# Patient Record
Sex: FEMALE | Race: WHITE | NOT HISPANIC OR LATINO
[De-identification: names, ages, dates, MRNs, and addresses within clinical notes are randomized per-mention and may not be internally consistent; named-entity substitution may affect disease eponyms.]

---

## 2017-06-07 ENCOUNTER — TRANSCRIPTION ENCOUNTER (OUTPATIENT)
Age: 72
End: 2017-06-07

## 2020-02-11 ENCOUNTER — APPOINTMENT (OUTPATIENT)
Dept: INTERNAL MEDICINE | Facility: CLINIC | Age: 75
End: 2020-02-11
Payer: MEDICARE

## 2020-02-11 ENCOUNTER — NON-APPOINTMENT (OUTPATIENT)
Age: 75
End: 2020-02-11

## 2020-02-11 VITALS
HEIGHT: 67 IN | RESPIRATION RATE: 18 BRPM | HEART RATE: 77 BPM | BODY MASS INDEX: 29.51 KG/M2 | OXYGEN SATURATION: 96 % | DIASTOLIC BLOOD PRESSURE: 82 MMHG | WEIGHT: 188 LBS | TEMPERATURE: 97.8 F | SYSTOLIC BLOOD PRESSURE: 128 MMHG

## 2020-02-11 DIAGNOSIS — R05 COUGH: ICD-10-CM

## 2020-02-11 DIAGNOSIS — K21.9 GASTRO-ESOPHAGEAL REFLUX DISEASE W/OUT ESOPHAGITIS: ICD-10-CM

## 2020-02-11 DIAGNOSIS — R09.82 POSTNASAL DRIP: ICD-10-CM

## 2020-02-11 DIAGNOSIS — J45.909 UNSPECIFIED ASTHMA, UNCOMPLICATED: ICD-10-CM

## 2020-02-11 PROCEDURE — 99204 OFFICE O/P NEW MOD 45 MIN: CPT | Mod: 25

## 2020-02-11 PROCEDURE — 94060 EVALUATION OF WHEEZING: CPT

## 2020-02-11 RX ORDER — ATENOLOL 100 MG/1
100 TABLET ORAL
Refills: 0 | Status: ACTIVE | COMMUNITY

## 2020-02-11 NOTE — PHYSICAL EXAM
[No Acute Distress] : no acute distress [Normal Oropharynx] : normal oropharynx [No Neck Mass] : no neck mass [Normal Appearance] : normal appearance [Normal Rate/Rhythm] : normal rate/rhythm [Normal S1, S2] : normal s1, s2 [No Murmurs] : no murmurs [No Resp Distress] : no resp distress [Clear to Auscultation Bilaterally] : clear to auscultation bilaterally [Benign] : benign [No Abnormalities] : no abnormalities [No Cyanosis] : no cyanosis [No Edema] : no edema [No Clubbing] : no clubbing [Normal Color/ Pigmentation] : normal color/ pigmentation [No Rash] : no rash [No Focal Deficits] : no focal deficits [Normal Insight/judgment] : normal insight/judgment [Normal Affect] : normal affect

## 2020-02-11 NOTE — REVIEW OF SYSTEMS
[Fever] : no fever [Chills] : no chills [Cough] : cough [Sputum] : no sputum [Hemoptysis] : no hemoptysis [Chest Discomfort] : no chest discomfort [Dyspnea] : no dyspnea [Palpitations] : no palpitations [Dizziness] : no dizziness [Nasal Discharge] : nasal discharge [Negative] : Endocrine

## 2020-02-11 NOTE — HISTORY OF PRESENT ILLNESS
[TextBox_4] : This is initial pulmonary visit here for this 74-year-old female who has noted a cough since November of this year. She tells me she has had prolonged episodes of cough especially during the cold months season in the past. She is not noting sputum, hemoptysis, or dyspnea on exertion. She does notice a rare wheeze.  She is noting a runny nose, and postnasal drip. She is not noting heartburn but tells me that she does awaken during her sleep and has snacks.  She is not noting fever or chills.  She is a nonsmoker but did have secondhand smoke exposure during childhood. She has no history of asthma or COPD.

## 2020-02-11 NOTE — ASSESSMENT
[FreeTextEntry1] : #1 history of persistent cough that tend to occur during the cold months seasons. Patient has runny nose and postnasal drip.  For component of postnasal drip will treat with nasal saline spray p.r.n. and Flonase one spray per nostril b.i.d.  Possible component of GERD. Patient will stop snacks that she has when she awakens during sleep.  Begin Pepcid 20 mg p.o. q.d. q.h.s.  For possible airway reactivity in cold air, patient will begin Flovent 110 2 puffs b.i.d. with gargle after.  She'll also take Ceftin x5 days to treat for possible acute bronchitis.  PA and lateral chest x-ray.  For following appointment in one month for a return anytime on a p.r.n. basis.\par \par #2 Postnasal drip.\par #3 GERD.\par #4 Reactive airways.

## 2021-06-27 ENCOUNTER — EMERGENCY (EMERGENCY)
Facility: HOSPITAL | Age: 76
LOS: 0 days | Discharge: HOME | End: 2021-06-27
Attending: EMERGENCY MEDICINE
Payer: MEDICARE

## 2021-06-27 VITALS
TEMPERATURE: 98 F | OXYGEN SATURATION: 98 % | HEART RATE: 64 BPM | RESPIRATION RATE: 18 BRPM | DIASTOLIC BLOOD PRESSURE: 73 MMHG | SYSTOLIC BLOOD PRESSURE: 153 MMHG

## 2021-06-27 VITALS — WEIGHT: 225.09 LBS | HEIGHT: 67 IN

## 2021-06-27 DIAGNOSIS — R10.9 UNSPECIFIED ABDOMINAL PAIN: ICD-10-CM

## 2021-06-27 DIAGNOSIS — N20.0 CALCULUS OF KIDNEY: ICD-10-CM

## 2021-06-27 DIAGNOSIS — N13.2 HYDRONEPHROSIS WITH RENAL AND URETERAL CALCULOUS OBSTRUCTION: ICD-10-CM

## 2021-06-27 DIAGNOSIS — N23 UNSPECIFIED RENAL COLIC: ICD-10-CM

## 2021-06-27 LAB
ALBUMIN SERPL ELPH-MCNC: 3.2 G/DL — LOW (ref 3.3–5)
ALP SERPL-CCNC: 111 U/L — SIGNIFICANT CHANGE UP (ref 40–120)
ALT FLD-CCNC: 30 U/L — SIGNIFICANT CHANGE UP (ref 12–78)
ANION GAP SERPL CALC-SCNC: 7 MMOL/L — SIGNIFICANT CHANGE UP (ref 5–17)
APPEARANCE UR: ABNORMAL
APPEARANCE UR: CLEAR — SIGNIFICANT CHANGE UP
AST SERPL-CCNC: 10 U/L — LOW (ref 15–37)
BASOPHILS # BLD AUTO: 0.08 K/UL — SIGNIFICANT CHANGE UP (ref 0–0.2)
BASOPHILS NFR BLD AUTO: 0.7 % — SIGNIFICANT CHANGE UP (ref 0–2)
BILIRUB SERPL-MCNC: 1.5 MG/DL — HIGH (ref 0.2–1.2)
BILIRUB UR-MCNC: NEGATIVE — SIGNIFICANT CHANGE UP
BILIRUB UR-MCNC: NEGATIVE — SIGNIFICANT CHANGE UP
BUN SERPL-MCNC: 14 MG/DL — SIGNIFICANT CHANGE UP (ref 7–23)
CALCIUM SERPL-MCNC: 9.2 MG/DL — SIGNIFICANT CHANGE UP (ref 8.5–10.1)
CHLORIDE SERPL-SCNC: 106 MMOL/L — SIGNIFICANT CHANGE UP (ref 96–108)
CO2 SERPL-SCNC: 23 MMOL/L — SIGNIFICANT CHANGE UP (ref 22–31)
COLOR SPEC: YELLOW — SIGNIFICANT CHANGE UP
COLOR SPEC: YELLOW — SIGNIFICANT CHANGE UP
CREAT SERPL-MCNC: 1.15 MG/DL — SIGNIFICANT CHANGE UP (ref 0.5–1.3)
DIFF PNL FLD: ABNORMAL
DIFF PNL FLD: ABNORMAL
EOSINOPHIL # BLD AUTO: 0.06 K/UL — SIGNIFICANT CHANGE UP (ref 0–0.5)
EOSINOPHIL NFR BLD AUTO: 0.5 % — SIGNIFICANT CHANGE UP (ref 0–6)
GLUCOSE SERPL-MCNC: 145 MG/DL — HIGH (ref 70–99)
GLUCOSE UR QL: NEGATIVE MG/DL — SIGNIFICANT CHANGE UP
GLUCOSE UR QL: NEGATIVE MG/DL — SIGNIFICANT CHANGE UP
HCT VFR BLD CALC: 42 % — SIGNIFICANT CHANGE UP (ref 34.5–45)
HGB BLD-MCNC: 14 G/DL — SIGNIFICANT CHANGE UP (ref 11.5–15.5)
IMM GRANULOCYTES NFR BLD AUTO: 0.4 % — SIGNIFICANT CHANGE UP (ref 0–1.5)
KETONES UR-MCNC: ABNORMAL
KETONES UR-MCNC: ABNORMAL
LACTATE SERPL-SCNC: 1 MMOL/L — SIGNIFICANT CHANGE UP (ref 0.7–2)
LEUKOCYTE ESTERASE UR-ACNC: ABNORMAL
LEUKOCYTE ESTERASE UR-ACNC: ABNORMAL
LYMPHOCYTES # BLD AUTO: 1.63 K/UL — SIGNIFICANT CHANGE UP (ref 1–3.3)
LYMPHOCYTES # BLD AUTO: 14.5 % — SIGNIFICANT CHANGE UP (ref 13–44)
MCHC RBC-ENTMCNC: 30.4 PG — SIGNIFICANT CHANGE UP (ref 27–34)
MCHC RBC-ENTMCNC: 33.3 GM/DL — SIGNIFICANT CHANGE UP (ref 32–36)
MCV RBC AUTO: 91.3 FL — SIGNIFICANT CHANGE UP (ref 80–100)
MONOCYTES # BLD AUTO: 1.37 K/UL — HIGH (ref 0–0.9)
MONOCYTES NFR BLD AUTO: 12.2 % — SIGNIFICANT CHANGE UP (ref 2–14)
NEUTROPHILS # BLD AUTO: 8.07 K/UL — HIGH (ref 1.8–7.4)
NEUTROPHILS NFR BLD AUTO: 71.7 % — SIGNIFICANT CHANGE UP (ref 43–77)
NITRITE UR-MCNC: NEGATIVE — SIGNIFICANT CHANGE UP
NITRITE UR-MCNC: NEGATIVE — SIGNIFICANT CHANGE UP
PH UR: 5 — SIGNIFICANT CHANGE UP (ref 5–8)
PH UR: 5 — SIGNIFICANT CHANGE UP (ref 5–8)
PLATELET # BLD AUTO: 248 K/UL — SIGNIFICANT CHANGE UP (ref 150–400)
POTASSIUM SERPL-MCNC: 3.6 MMOL/L — SIGNIFICANT CHANGE UP (ref 3.5–5.3)
POTASSIUM SERPL-SCNC: 3.6 MMOL/L — SIGNIFICANT CHANGE UP (ref 3.5–5.3)
PROT SERPL-MCNC: 8.2 GM/DL — SIGNIFICANT CHANGE UP (ref 6–8.3)
PROT UR-MCNC: 30 MG/DL
PROT UR-MCNC: 30 MG/DL
RBC # BLD: 4.6 M/UL — SIGNIFICANT CHANGE UP (ref 3.8–5.2)
RBC # FLD: 13.6 % — SIGNIFICANT CHANGE UP (ref 10.3–14.5)
SODIUM SERPL-SCNC: 136 MMOL/L — SIGNIFICANT CHANGE UP (ref 135–145)
SP GR SPEC: 1.01 — SIGNIFICANT CHANGE UP (ref 1.01–1.02)
SP GR SPEC: 1.01 — SIGNIFICANT CHANGE UP (ref 1.01–1.02)
UROBILINOGEN FLD QL: NEGATIVE MG/DL — SIGNIFICANT CHANGE UP
UROBILINOGEN FLD QL: NEGATIVE MG/DL — SIGNIFICANT CHANGE UP
WBC # BLD: 11.25 K/UL — HIGH (ref 3.8–10.5)
WBC # FLD AUTO: 11.25 K/UL — HIGH (ref 3.8–10.5)

## 2021-06-27 PROCEDURE — 81001 URINALYSIS AUTO W/SCOPE: CPT

## 2021-06-27 PROCEDURE — 36415 COLL VENOUS BLD VENIPUNCTURE: CPT

## 2021-06-27 PROCEDURE — 87086 URINE CULTURE/COLONY COUNT: CPT

## 2021-06-27 PROCEDURE — 74176 CT ABD & PELVIS W/O CONTRAST: CPT

## 2021-06-27 PROCEDURE — 80053 COMPREHEN METABOLIC PANEL: CPT

## 2021-06-27 PROCEDURE — 99284 EMERGENCY DEPT VISIT MOD MDM: CPT | Mod: GC

## 2021-06-27 PROCEDURE — 96375 TX/PRO/DX INJ NEW DRUG ADDON: CPT

## 2021-06-27 PROCEDURE — 99221 1ST HOSP IP/OBS SF/LOW 40: CPT

## 2021-06-27 PROCEDURE — 83605 ASSAY OF LACTIC ACID: CPT

## 2021-06-27 PROCEDURE — 96374 THER/PROPH/DIAG INJ IV PUSH: CPT

## 2021-06-27 PROCEDURE — 74176 CT ABD & PELVIS W/O CONTRAST: CPT | Mod: 26,ME

## 2021-06-27 PROCEDURE — G1004: CPT

## 2021-06-27 PROCEDURE — 99284 EMERGENCY DEPT VISIT MOD MDM: CPT | Mod: 25

## 2021-06-27 PROCEDURE — 85025 COMPLETE CBC W/AUTO DIFF WBC: CPT

## 2021-06-27 RX ORDER — OXYCODONE HYDROCHLORIDE 5 MG/1
1 TABLET ORAL
Qty: 28 | Refills: 0
Start: 2021-06-27 | End: 2021-07-03

## 2021-06-27 RX ORDER — KETOROLAC TROMETHAMINE 30 MG/ML
15 SYRINGE (ML) INJECTION ONCE
Refills: 0 | Status: DISCONTINUED | OUTPATIENT
Start: 2021-06-27 | End: 2021-06-27

## 2021-06-27 RX ORDER — ONDANSETRON 8 MG/1
1 TABLET, FILM COATED ORAL
Qty: 7 | Refills: 0
Start: 2021-06-27 | End: 2021-07-03

## 2021-06-27 RX ORDER — CEFTRIAXONE 500 MG/1
1000 INJECTION, POWDER, FOR SOLUTION INTRAMUSCULAR; INTRAVENOUS ONCE
Refills: 0 | Status: COMPLETED | OUTPATIENT
Start: 2021-06-27 | End: 2021-06-27

## 2021-06-27 RX ORDER — KETOROLAC TROMETHAMINE 30 MG/ML
1 SYRINGE (ML) INJECTION
Qty: 21 | Refills: 0
Start: 2021-06-27 | End: 2021-07-03

## 2021-06-27 RX ORDER — CEFUROXIME AXETIL 250 MG
1 TABLET ORAL
Qty: 14 | Refills: 0
Start: 2021-06-27 | End: 2021-07-03

## 2021-06-27 RX ORDER — TAMSULOSIN HYDROCHLORIDE 0.4 MG/1
1 CAPSULE ORAL
Qty: 30 | Refills: 0
Start: 2021-06-27 | End: 2021-07-26

## 2021-06-27 RX ORDER — ONDANSETRON 8 MG/1
4 TABLET, FILM COATED ORAL ONCE
Refills: 0 | Status: COMPLETED | OUTPATIENT
Start: 2021-06-27 | End: 2021-06-27

## 2021-06-27 RX ORDER — SODIUM CHLORIDE 9 MG/ML
1000 INJECTION INTRAMUSCULAR; INTRAVENOUS; SUBCUTANEOUS ONCE
Refills: 0 | Status: COMPLETED | OUTPATIENT
Start: 2021-06-27 | End: 2021-06-27

## 2021-06-27 RX ADMIN — SODIUM CHLORIDE 1000 MILLILITER(S): 9 INJECTION INTRAMUSCULAR; INTRAVENOUS; SUBCUTANEOUS at 14:56

## 2021-06-27 RX ADMIN — ONDANSETRON 4 MILLIGRAM(S): 8 TABLET, FILM COATED ORAL at 15:30

## 2021-06-27 RX ADMIN — CEFTRIAXONE 1000 MILLIGRAM(S): 500 INJECTION, POWDER, FOR SOLUTION INTRAMUSCULAR; INTRAVENOUS at 17:48

## 2021-06-27 RX ADMIN — Medication 15 MILLIGRAM(S): at 14:56

## 2021-06-27 NOTE — ED ADULT TRIAGE NOTE - BSA (M2)
STOP AT THE  TO SCHEDULE YOUR FOLLOW UP APPOINTMENTS, LABS, and IMAGING.  [facility] phone for appointments: [phone]    Please contact our office with any questions or concerns.      services: [phone]     On-call Nephrologist (Kidney Transplant) or Gastroenterologist (Liver Transplant/ TPIAT) for after hours, weekends and urgent concerns: [phone].     Transplant Team:     -Tish Greenberg, RN Transplant Coordinator [phone]   -Horacio Sommers, RN Transplant Coordinator [phone]   -Manjula Boggs, RN Transplant Coordinator [phone]   -Kaitlynn Hirsch, APRN [phone]   -Latanya Arora APRN [phone]   -Fax #: [phone]    -Sarahy Cheema- call for pre-transplant & TPIAT complex [phone]   -Sonali Buckley- call for post transplant complex [phone]     To have the coordinators paged if needed call    Main Transplant Phone: [phone] option 3    [facility]- Mail order [phone]      2.13

## 2021-06-27 NOTE — ED PROVIDER NOTE - PATIENT PORTAL LINK FT
You can access the FollowMyHealth Patient Portal offered by [facility] by registering at the following website: [email or web address]. By joining [employer]’s FollowMyHealth portal, you will also be able to view your health information using other applications (apps) compatible with our system.

## 2021-06-27 NOTE — ED PROVIDER NOTE - CARE PROVIDER_API CALL
Da Carlton)  Urology  [address], [address] [place] [address]  Phone: [phone]  Fax: [phone]  Follow Up Time: 7-10 Days

## 2021-06-27 NOTE — ED PROVIDER NOTE - OBJECTIVE STATEMENT
74 y/o F with a PMHx of asthma, GERD, HLD, benign essential hypertension, nephrolithiasis presents to the ED ambulatory c/o lower abd pain and back pain x couple days. Pt states L flank pain travels to left lower quadrant abdomen. Reports nausea and vomiting. Denies fever, chills, hematuria, dysuria or any other symptoms. Pt has a hx of kidney stones, and states the pain feels similar. Prior urologist was Dr. Lyons, but does not follow up with anyone currently. NKDA. PCP: Dr. Warner Cline

## 2021-06-27 NOTE — ED ADULT NURSE NOTE - OBJECTIVE STATEMENT
Pt presents to er with complaints of lower abd pain and history of kidney stones, pt states she has a history of kidney stones in the past, denies urinary obstructions, fevers at this time, denies chest pain, sob, headaches, pt states she feels nauseated with decreased po intake, safety maintained with stretcher in lowest position, will continue to monitor.

## 2021-06-27 NOTE — CONSULT NOTE ADULT - SUBJECTIVE AND OBJECTIVE BOX
This is a 76 y/o F with a PMH of asthma, GERD, HLD, HTN, nephrolithiasis with c/o lower abd pain and back pain x couple days. Pt states L flank pain travels to left lower quadrant abdomen. Reports nausea and vomiting, decreased appetite. Denies fever, chills, hematuria, dysuria or any other symptoms. Pt has a hx of kidney stones, and states the pain feels similar. Prior urologist was Dr. Lyons, but does not follow up with anyone currently.    PMH: as above    PSH: none    Meds:   PGN83po daily  Norvasc 10mg daily  Atenolol 100mg daily  Avapro 300mg daily  Dilaudid 2mg daily  Gabapentin 300mg daily    ROS: +nausea, vomiting, decreased appetite, flank pain. All other systems negative    Vital Signs Last 24 Hrs  T(C): 36.9 (2021 14:35), Max: 36.9 (2021 14:35)  T(F): 98.4 (2021 14:35), Max: 98.4 (2021 14:35)  HR: 64 (2021 14:35) (64 - 64)  BP: 153/73 (2021 14:35) (153/73 - 153/73)  BP(mean): 124 (2021 14:35) (124 - 124)  RR: 18 (2021 14:35) (18 - 18)  SpO2: 98% (2021 14:35) (98% - 98%)    Exam:   Gen: NAD, A&Ox3  CV: S1S2 RRR  Lungs: CTA bl No WRR  Abd: soft NT ND +BS, No CVA tenderness  Ext: No LE edema      CBC Full  -  ( 2021 14:51 )  WBC Count : 11.25 K/uL  RBC Count : 4.60 M/uL  Hemoglobin : 14.0 g/dL  Hematocrit : 42.0 %  Platelet Count - Automated : 248 K/uL  Mean Cell Volume : 91.3 fl  Mean Cell Hemoglobin : 30.4 pg  Mean Cell Hemoglobin Concentration : 33.3 gm/dL  Auto Neutrophil # : 8.07 K/uL  Auto Lymphocyte # : 1.63 K/uL  Auto Monocyte # : 1.37 K/uL  Auto Eosinophil # : 0.06 K/uL  Auto Basophil # : 0.08 K/uL  Auto Neutrophil % : 71.7 %  Auto Lymphocyte % : 14.5 %  Auto Monocyte % : 12.2 %  Auto Eosinophil % : 0.5 %  Auto Basophil % : 0.7 %          136  |  106  |  14  ----------------------------<  145<H>  3.6   |  23  |  1.15    Ca    9.2      2021 14:51    TPro  8.2  /  Alb  3.2<L>  /  TBili  1.5<H>  /  DBili  x   /  AST  10<L>  /  ALT  30  /  AlkPhos  111      Urinalysis Basic - ( 2021 16:33 )    Color: Yellow / Appearance: Slightly Turbid / S.015 / pH: x  Gluc: x / Ketone: Small  / Bili: Negative / Urobili: Negative mg/dL   Blood: x / Protein: 30 mg/dL / Nitrite: Negative   Leuk Esterase: Moderate / RBC: 11-25 /HPF / WBC >50   Sq Epi: x / Non Sq Epi: Moderate / Bacteria: Moderate      EXAM:  CT ABDOMEN AND PELVIS                            PROCEDURE DATE:  2021          INTERPRETATION:  CLINICAL INFORMATION: Left flank pain    COMPARISON: None.    CONTRAST/COMPLICATIONS:  IV Contrast: NONE  Oral Contrast: NONE  Complications: None reported at time of study completion    PROCEDURE:  CT of the Abdomen and Pelvis was performed.  Sagittal and coronal reformats were performed.    FINDINGS:  LOWER CHEST: Bibasilar bronchiectasis.    LIVER: Within normal limits.  BILE DUCTS: Normal caliber.  GALLBLADDER: Within normal limits.  SPLEEN: Within normal limits.  PANCREAS: Within normal limits.  ADRENALS: Within normal limits.  KIDNEYS/URETERS: Bilateral parapelvic renal cysts. Left hydronephrosis secondary to a 5 mm stone at the left UPJ. Degree of hydronephrosis is difficult to ascertain given parapelvic cysts.    BLADDER: Within normal limits.  REPRODUCTIVE ORGANS: Uterus and adnexa within normal limits.    BOWEL: No bowel obstruction.  PERITONEUM: No ascites.  VESSELS: Atherosclerotic changes.  RETROPERITONEUM/LYMPH NODES: No lymphadenopathy.  ABDOMINAL WALL: Within normal limits.  BONES: Degenerative changes. Sacral Tarlov cysts.    IMPRESSION:  Left hydronephrosis secondary to a 5 mm stone at the left UPJ.            SEBASTIAN MINDY MD; Attending Radiologist  This document has been electronically signed. 2021  3:59PM   This is a 74 y/o F with a PMH of asthma, GERD, HLD, HTN, nephrolithiasis with c/o lower abd pain and back pain x couple days. Pt states L flank pain travels to left lower quadrant abdomen. Reports nausea and vomiting, decreased appetite. Denies fever, chills, hematuria, dysuria or any other symptoms. Pt has a hx of kidney stones, and states the pain feels similar. Prior urologist was Dr. Mendoza, but does not follow up with anyone currently.    PMH: as above    PSH: none    Meds:   KBG02lb daily  Norvasc 10mg daily  Atenolol 100mg daily  Avapro 300mg daily  Dilaudid 2mg daily  Gabapentin 300mg daily    ROS: +nausea, vomiting, decreased appetite, flank pain. All other systems negative    Vital Signs Last 24 Hrs  T(C): 36.9 (2021 14:35), Max: 36.9 (2021 14:35)  T(F): 98.4 (2021 14:35), Max: 98.4 (2021 14:35)  HR: 64 (2021 14:35) (64 - 64)  BP: 153/73 (2021 14:35) (153/73 - 153/73)  BP(mean): 124 (2021 14:35) (124 - 124)  RR: 18 (2021 14:35) (18 - 18)  SpO2: 98% (2021 14:35) (98% - 98%)    Exam:   Gen: NAD, A&Ox3  CV: S1S2 RRR  Lungs: CTA bl No WRR  Abd: soft NT ND +BS, No CVA tenderness  Ext: No LE edema      CBC Full  -  ( 2021 14:51 )  WBC Count : 11.25 K/uL  RBC Count : 4.60 M/uL  Hemoglobin : 14.0 g/dL  Hematocrit : 42.0 %  Platelet Count - Automated : 248 K/uL  Mean Cell Volume : 91.3 fl  Mean Cell Hemoglobin : 30.4 pg  Mean Cell Hemoglobin Concentration : 33.3 gm/dL  Auto Neutrophil # : 8.07 K/uL  Auto Lymphocyte # : 1.63 K/uL  Auto Monocyte # : 1.37 K/uL  Auto Eosinophil # : 0.06 K/uL  Auto Basophil # : 0.08 K/uL  Auto Neutrophil % : 71.7 %  Auto Lymphocyte % : 14.5 %  Auto Monocyte % : 12.2 %  Auto Eosinophil % : 0.5 %  Auto Basophil % : 0.7 %          136  |  106  |  14  ----------------------------<  145<H>  3.6   |  23  |  1.15    Ca    9.2      2021 14:51    TPro  8.2  /  Alb  3.2<L>  /  TBili  1.5<H>  /  DBili  x   /  AST  10<L>  /  ALT  30  /  AlkPhos  111      Urinalysis Basic - ( 2021 16:33 )    Color: Yellow / Appearance: Slightly Turbid / S.015 / pH: x  Gluc: x / Ketone: Small  / Bili: Negative / Urobili: Negative mg/dL   Blood: x / Protein: 30 mg/dL / Nitrite: Negative   Leuk Esterase: Moderate / RBC: 11-25 /HPF / WBC >50   Sq Epi: x / Non Sq Epi: Moderate / Bacteria: Moderate      EXAM:  CT ABDOMEN AND PELVIS                            PROCEDURE DATE:  2021          INTERPRETATION:  CLINICAL INFORMATION: Left flank pain    COMPARISON: None.    CONTRAST/COMPLICATIONS:  IV Contrast: NONE  Oral Contrast: NONE  Complications: None reported at time of study completion    PROCEDURE:  CT of the Abdomen and Pelvis was performed.  Sagittal and coronal reformats were performed.    FINDINGS:  LOWER CHEST: Bibasilar bronchiectasis.    LIVER: Within normal limits.  BILE DUCTS: Normal caliber.  GALLBLADDER: Within normal limits.  SPLEEN: Within normal limits.  PANCREAS: Within normal limits.  ADRENALS: Within normal limits.  KIDNEYS/URETERS: Bilateral parapelvic renal cysts. Left hydronephrosis secondary to a 5 mm stone at the left UPJ. Degree of hydronephrosis is difficult to ascertain given parapelvic cysts.    BLADDER: Within normal limits.  REPRODUCTIVE ORGANS: Uterus and adnexa within normal limits.    BOWEL: No bowel obstruction.  PERITONEUM: No ascites.  VESSELS: Atherosclerotic changes.  RETROPERITONEUM/LYMPH NODES: No lymphadenopathy.  ABDOMINAL WALL: Within normal limits.  BONES: Degenerative changes. Sacral Tarlov cysts.    IMPRESSION:  Left hydronephrosis secondary to a 5 mm stone at the left UPJ.            SEBASTIAN MINDY MD; Attending Radiologist  This document has been electronically signed. 2021  3:59PM

## 2021-06-27 NOTE — ED PROVIDER NOTE - CLINICAL SUMMARY MEDICAL DECISION MAKING FREE TEXT BOX
delores pgy3: 75F w/ L flank pain rad to groin, suspect likely recurrent nephrolithiasis. Will stone montgomery, send labs, UA to r/o associated uti, pain meds, f/u as indicated.

## 2021-06-27 NOTE — ED PROVIDER NOTE - PROGRESS NOTE DETAILS
delores: spoke to urology on call - patient stable, will treat urine prophylactically. recurrent L nephrolithiasis, will dc w/ flomax, toradol, tylenol & zofran prn, send cefuroxime pending cultures. PT to f/u with Dr. Carlton in 1 week.

## 2021-06-27 NOTE — CONSULT NOTE ADULT - ASSESSMENT
This is a 74yo F w recurrent left nephrolithiasis and hydronephrosis  Pain control  Flomax  Toradol 10mg TID for severe pain  Tylenol/ibuprophen for moderate pain  Zofran for nausea  Cefuroxime 500mg BID pending cultures  Strain urine  F/U with Dr Carlton in 1 week  D/W Dr Carlton  This is a 74yo F w recurrent left nephrolithiasis and mild hydronephrosis  Repeat urine as contaminated specimen  Flomax  Toradol 10mg TID for severe pain  Tylenol/ibuprophen for moderate pain  Zofran for nausea  Cefuroxime 500mg BID pending cultures  Strain urine  F/U with Dr Carlton in 1 week  D/W Dr Carlton

## 2021-06-27 NOTE — PROVIDER CONTACT NOTE (OTHER) - SITUATION
[facility] Dr. Joseph spoke to Dr. Carlton (oncall urology)  paged @[phone], returned page @[phone]

## 2021-06-27 NOTE — ED PROVIDER NOTE - NSFOLLOWUPINSTRUCTIONS_ED_ALL_ED_FT
Please follow up with your primary care provider for further concerns you may have regarding your general health. Attached you will find your results from today's visit. Continue taking your medications as prescribed and keep your upcoming medical appointments.    You have been diagnosed with a kidney stone. To control the pain, you should take Ibuprofen 400 mg along with Tylenol 650mg every 6 hours. These are both over the counter medications that you can  at your local pharmacy without a prescription. We also sent a stronger pain medication to your pharmacy that you should only take when you are still having pain despite trying Tylenol and Ibuprofen. If you are still having severe pain in 48 hours you should return to the emergency room or a urologist if able. If you began having fever, uncontrollable nausea and vomiting then you also need to come back to the ED.    Follow up with Dr. Carlton (urology) in 1 week.

## 2021-06-27 NOTE — ED ADULT TRIAGE NOTE - CHIEF COMPLAINT QUOTE
Patient presents with back and lower abdominal pain x 4 days, now with lower abdominal pain, confining her to bed for 4 days, decreased po intake, family concerned for dehydration.  Patient has a h/o kidney stones.  denies fever/chills.

## 2021-06-28 RX ORDER — CEFUROXIME AXETIL 250 MG
1 TABLET ORAL
Qty: 14 | Refills: 0
Start: 2021-06-28 | End: 2021-07-04

## 2021-06-29 LAB
CULTURE RESULTS: SIGNIFICANT CHANGE UP
SPECIMEN SOURCE: SIGNIFICANT CHANGE UP

## 2021-07-14 ENCOUNTER — APPOINTMENT (OUTPATIENT)
Dept: UROLOGY | Facility: CLINIC | Age: 76
End: 2021-07-14
Payer: MEDICARE

## 2021-07-14 VITALS — SYSTOLIC BLOOD PRESSURE: 169 MMHG | HEART RATE: 65 BPM | DIASTOLIC BLOOD PRESSURE: 91 MMHG | OXYGEN SATURATION: 94 %

## 2021-07-14 DIAGNOSIS — Z84.1 FAMILY HISTORY OF DISORDERS OF KIDNEY AND URETER: ICD-10-CM

## 2021-07-14 DIAGNOSIS — E83.50 UNSPECIFIED DISORDER OF CALCIUM METABOLISM: ICD-10-CM

## 2021-07-14 PROCEDURE — 99204 OFFICE O/P NEW MOD 45 MIN: CPT

## 2021-07-14 NOTE — PHYSICAL EXAM
[Normal Appearance] : normal appearance [General Appearance - In No Acute Distress] : no acute distress [FreeTextEntry1] : normal peripheral circulation  [] : no respiratory distress [Abdomen Soft] : soft [Abdomen Tenderness] : non-tender [Costovertebral Angle Tenderness] : no ~M costovertebral angle tenderness [Normal Station and Gait] : the gait and station were normal for the patient's age [Skin Color & Pigmentation] : normal skin color and pigmentation [No Focal Deficits] : no focal deficits [Oriented To Time, Place, And Person] : oriented to person, place, and time

## 2021-07-14 NOTE — ASSESSMENT
[FreeTextEntry1] : Reviewed outside records. \par 5 mm left proximal ureteral stone with Renal cysts. \par \par Ureteral stone:\par Discussed treatment options: Continued medical management Vs Shock wave lithotripsy Vs Ureteroscopy.\par Risks and benefits of each modality were discussed. \par Wants to continue medical management. \par Recommended:\par Good oral hydration.\par Continue Flomax.\par PRN pain medication.\par Strain urine.\par Will get Renal and Bladder Ultrasound and KUB. \par Will inform results. \par Call for fevers, chill, severe nausea and vomiting, or severe pain or go to ED for worsening of medical condition may need urgent ureteral stent placement. \par \par Disorder of calcium metabolism:\par Recommended Kidney stone prevention diet:\par Good oral hydration so that urine is clear to light yellow, usually 1.5 to 2 Liters of fluids, mainly water\par Increasing Citrate in diet by consuming citrus fruits and juices- kevin, limes, oranges, grapefruits and berries \par Less red meat\par Less salt\par Limit foods with oxalate like- dark green vegetables, rhubarb, chocolate, wheat bran, nuts, cranberries, and beans\par Will get Kidney stone metabolic work up- Ca, PTH, Uric acid and 24 Hr urine kidney stone risk profile before follow up appointment. \par \par Return to office in 3 months or sooner if any issues.

## 2021-07-14 NOTE — HISTORY OF PRESENT ILLNESS
[FreeTextEntry1] : 74 yo female presents for ureteral stone. \par Went to [facility] ED few weeks ago with severe left flank pain, nausea and vomiting. \par Had CT scan was told has kidney stone and sent home on Flomax, Antibiotics, anti nausea medicine and Oxycodone. Stopped Antibiotics, taking Flomax. \par No longer has pain. Did not see the stone pass. Had Hematuria for few days after ED visit. \par \par Denies any difficulty with urination. Nocturia 2 x. \par Denies dysuria,fever, chills or rigors. \par \par Has had kidney stone in the past 2 x: 2014 and 2015, passed. Stone analysis: calcium oxalate monohydrate.

## 2021-07-20 ENCOUNTER — APPOINTMENT (OUTPATIENT)
Dept: RADIOLOGY | Facility: CLINIC | Age: 76
End: 2021-07-20
Payer: MEDICARE

## 2021-07-20 ENCOUNTER — APPOINTMENT (OUTPATIENT)
Dept: ULTRASOUND IMAGING | Facility: CLINIC | Age: 76
End: 2021-07-20
Payer: MEDICARE

## 2021-07-20 ENCOUNTER — OUTPATIENT (OUTPATIENT)
Dept: OUTPATIENT SERVICES | Facility: HOSPITAL | Age: 76
LOS: 1 days | End: 2021-07-20
Payer: MEDICARE

## 2021-07-20 DIAGNOSIS — N20.1 CALCULUS OF URETER: ICD-10-CM

## 2021-07-20 PROCEDURE — 76770 US EXAM ABDO BACK WALL COMP: CPT | Mod: 26

## 2021-07-20 PROCEDURE — 76770 US EXAM ABDO BACK WALL COMP: CPT

## 2021-07-20 PROCEDURE — 74018 RADEX ABDOMEN 1 VIEW: CPT | Mod: 26

## 2021-07-20 PROCEDURE — 74018 RADEX ABDOMEN 1 VIEW: CPT

## 2021-07-23 DIAGNOSIS — N20.1 CALCULUS OF URETER: ICD-10-CM

## 2021-07-26 LAB
ANION GAP SERPL CALC-SCNC: 13 MMOL/L
BUN SERPL-MCNC: 20 MG/DL
CALCIUM SERPL-MCNC: 9.9 MG/DL
CHLORIDE SERPL-SCNC: 107 MMOL/L
CO2 SERPL-SCNC: 22 MMOL/L
CREAT SERPL-MCNC: 0.7 MG/DL
GLUCOSE SERPL-MCNC: 153 MG/DL
POTASSIUM SERPL-SCNC: 4.3 MMOL/L
SODIUM SERPL-SCNC: 142 MMOL/L

## 2021-08-16 ENCOUNTER — OUTPATIENT (OUTPATIENT)
Dept: OUTPATIENT SERVICES | Facility: HOSPITAL | Age: 76
LOS: 1 days | End: 2021-08-16
Payer: MEDICARE

## 2021-08-16 ENCOUNTER — APPOINTMENT (OUTPATIENT)
Dept: RADIOLOGY | Facility: CLINIC | Age: 76
End: 2021-08-16
Payer: MEDICARE

## 2021-08-16 ENCOUNTER — APPOINTMENT (OUTPATIENT)
Dept: ULTRASOUND IMAGING | Facility: CLINIC | Age: 76
End: 2021-08-16
Payer: MEDICARE

## 2021-08-16 DIAGNOSIS — N20.1 CALCULUS OF URETER: ICD-10-CM

## 2021-08-16 PROCEDURE — 76770 US EXAM ABDO BACK WALL COMP: CPT | Mod: 26

## 2021-08-16 PROCEDURE — 76770 US EXAM ABDO BACK WALL COMP: CPT

## 2021-08-19 ENCOUNTER — NON-APPOINTMENT (OUTPATIENT)
Age: 76
End: 2021-08-19

## 2021-08-25 ENCOUNTER — APPOINTMENT (OUTPATIENT)
Dept: UROLOGY | Facility: CLINIC | Age: 76
End: 2021-08-25

## 2021-10-13 ENCOUNTER — APPOINTMENT (OUTPATIENT)
Dept: UROLOGY | Facility: CLINIC | Age: 76
End: 2021-10-13

## 2023-06-20 ENCOUNTER — APPOINTMENT (OUTPATIENT)
Dept: INTERNAL MEDICINE | Facility: CLINIC | Age: 78
End: 2023-06-20
Payer: MEDICARE

## 2023-06-20 VITALS
HEART RATE: 65 BPM | BODY MASS INDEX: 33.59 KG/M2 | RESPIRATION RATE: 16 BRPM | SYSTOLIC BLOOD PRESSURE: 161 MMHG | WEIGHT: 209 LBS | OXYGEN SATURATION: 92 % | TEMPERATURE: 97.8 F | HEIGHT: 66 IN | DIASTOLIC BLOOD PRESSURE: 79 MMHG

## 2023-06-20 DIAGNOSIS — J98.4 OTHER DISORDERS OF LUNG: ICD-10-CM

## 2023-06-20 DIAGNOSIS — R05.9 COUGH, UNSPECIFIED: ICD-10-CM

## 2023-06-20 DIAGNOSIS — Z78.9 OTHER SPECIFIED HEALTH STATUS: ICD-10-CM

## 2023-06-20 DIAGNOSIS — R94.2 ABNORMAL RESULTS OF PULMONARY FUNCTION STUDIES: ICD-10-CM

## 2023-06-20 DIAGNOSIS — J67.2 BIRD FANCIER'S LUNG: ICD-10-CM

## 2023-06-20 PROCEDURE — 99204 OFFICE O/P NEW MOD 45 MIN: CPT | Mod: 25

## 2023-06-20 PROCEDURE — 94060 EVALUATION OF WHEEZING: CPT

## 2023-06-20 PROCEDURE — 94727 GAS DIL/WSHOT DETER LNG VOL: CPT

## 2023-06-20 PROCEDURE — 94729 DIFFUSING CAPACITY: CPT

## 2023-06-20 PROCEDURE — ZZZZZ: CPT

## 2023-06-20 RX ORDER — FLUTICASONE PROPIONATE 50 UG/1
50 SPRAY, METERED NASAL
Qty: 1 | Refills: 1 | Status: DISCONTINUED | COMMUNITY
Start: 2020-02-11 | End: 2023-06-20

## 2023-06-20 RX ORDER — GLIPIZIDE 5 MG/1
5 TABLET ORAL DAILY
Refills: 0 | Status: ACTIVE | COMMUNITY

## 2023-06-20 RX ORDER — FAMOTIDINE 20 MG/1
20 TABLET, FILM COATED ORAL
Qty: 30 | Refills: 2 | Status: DISCONTINUED | COMMUNITY
Start: 2020-02-11 | End: 2023-06-20

## 2023-06-20 RX ORDER — CEFUROXIME AXETIL 500 MG/1
500 TABLET ORAL
Qty: 10 | Refills: 0 | Status: DISCONTINUED | COMMUNITY
Start: 2020-02-11 | End: 2023-06-20

## 2023-06-20 RX ORDER — ATORVASTATIN CALCIUM 20 MG/1
20 TABLET, FILM COATED ORAL
Refills: 0 | Status: ACTIVE | COMMUNITY

## 2023-06-20 RX ORDER — FLUTICASONE PROPIONATE 110 UG/1
110 AEROSOL, METERED RESPIRATORY (INHALATION) TWICE DAILY
Qty: 1 | Refills: 1 | Status: DISCONTINUED | COMMUNITY
Start: 2020-02-11 | End: 2023-06-20

## 2023-06-20 NOTE — HISTORY OF PRESENT ILLNESS
[TextBox_4] : Mrs. Oliva is a 77-year-old female who presents for initial pulmonary evaluation.  She is accompanied by her daughter, Teresa, who is a surgical PA at [employer].  Mrs. Osei has a history of right lower lobe pulmonary nodule which has been followed by CT scan of the chest by her primary care doctor, Dr. Ng.  Mrs. Osei has also been having a persistent, unproductive cough.  It can occur anytime throughout the day.  She has no nocturnal symptoms of cough or dyspnea.  There is no previous history of asthma or COPD.  Mrs. Osei is a lifelong non-smoker.  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  At the present time she has approximately 15 pigeons in her garage.  The patient also has 3 parrots in her house.  She does have some symptoms of shortness of breath with exertion.  Activities such as walking greater than 100 feet for prolonged talking will cause some shortness of breath.  Symptoms resolved with rest.  She does not walk up stairs because [place] and also because she has painful knees secondary to arthritis..  There is no other significant occupational exposure.

## 2023-06-20 NOTE — PROCEDURE
[FreeTextEntry1] : Complete pulmonary function test performed.\par FVC 2.25 L which is 82% predicted.  FEV1 1.87 L which is 90% predicted.\par FEV1/FVC ratio 83%.\par FEF 25/75% 2.19 L/s which is 134% predicted.  PEF 4.47 L/s which is 86% predicted.\par Postbronchodilator therapy: FEV1 2.0 L which is 96% predicted.  PEF 5.13 L/s which is 99% predicted.  There is no significant bronchodilator response.  Total lung capacity is decreased at 3.46 L which is 66% predicted.  Diffusing capacity is moderately impaired at 66%.\par \par Complete pulmonary function test show mild to moderate restrictive lung defect with decreased diffusing capacity.  There is no obstruction.

## 2023-06-20 NOTE — DISCUSSION/SUMMARY
[FreeTextEntry1] : Mrs. Osei presents for initial pulmonary evaluation.  She has a history of a 9 mm right lower lobe nodule which has been stable on CAT scan.  Patient does have a persistent, dry cough.  She previously had cough induced by ACE inhibitor's.  The cough occurs only during the day.  She has no night symptoms.  Complete pulmonary function test do show mild to moderate restrictive defect with decreased diffusing capacity.  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  She does have a slight mosaic pattern on CT scan of the chest which could be consistent with a hypersensitivity pneumonitis.  She does not require metered-dose inhaler therapy.  Follow-up in 1 year with repeat pulmonary function tests and CT scan of the chest if necessary.

## 2024-05-02 NOTE — ED PROVIDER NOTE - CHPI ED SYMPTOMS NEG
Pt called stating went to the pharmacy said refill isnt there asking for a refill to be sent in      alendronate (FOSAMAX) 70 MG tablet       [id number] - [place], [place] - [address] - P [phone] - F [phone]  [address] [address]  Phone: [phone]  Fax: [phone]    No chills/no fever

## 2024-05-14 ENCOUNTER — NON-APPOINTMENT (OUTPATIENT)
Age: 79
End: 2024-05-14

## 2025-03-25 ENCOUNTER — APPOINTMENT (OUTPATIENT)
Dept: DERMATOLOGY | Facility: CLINIC | Age: 80
End: 2025-03-25
Payer: MEDICARE

## 2025-03-25 ENCOUNTER — NON-APPOINTMENT (OUTPATIENT)
Age: 80
End: 2025-03-25

## 2025-03-25 DIAGNOSIS — L81.4 OTHER MELANIN HYPERPIGMENTATION: ICD-10-CM

## 2025-03-25 DIAGNOSIS — D22.9 MELANOCYTIC NEVI, UNSPECIFIED: ICD-10-CM

## 2025-03-25 DIAGNOSIS — D48.5 NEOPLASM OF UNCERTAIN BEHAVIOR OF SKIN: ICD-10-CM

## 2025-03-25 PROCEDURE — 11102 TANGNTL BX SKIN SINGLE LES: CPT

## 2025-03-25 PROCEDURE — 99203 OFFICE O/P NEW LOW 30 MIN: CPT | Mod: 25

## 2025-03-25 RX ORDER — GABAPENTIN 300 MG/1
300 CAPSULE ORAL
Refills: 0 | Status: ACTIVE | COMMUNITY

## 2025-03-25 RX ORDER — TRAZODONE HYDROCHLORIDE 50 MG/1
50 TABLET ORAL
Refills: 0 | Status: ACTIVE | COMMUNITY